# Patient Record
Sex: FEMALE | Race: WHITE | NOT HISPANIC OR LATINO
[De-identification: names, ages, dates, MRNs, and addresses within clinical notes are randomized per-mention and may not be internally consistent; named-entity substitution may affect disease eponyms.]

---

## 2022-06-21 PROBLEM — Z00.00 ENCOUNTER FOR PREVENTIVE HEALTH EXAMINATION: Status: ACTIVE | Noted: 2022-06-21

## 2022-06-27 DIAGNOSIS — Z86.79 PERSONAL HISTORY OF OTHER DISEASES OF THE CIRCULATORY SYSTEM: ICD-10-CM

## 2022-06-27 DIAGNOSIS — Z86.39 PERSONAL HISTORY OF OTHER ENDOCRINE, NUTRITIONAL AND METABOLIC DISEASE: ICD-10-CM

## 2022-06-27 DIAGNOSIS — A49.9 URINARY TRACT INFECTION, SITE NOT SPECIFIED: ICD-10-CM

## 2022-06-27 DIAGNOSIS — Z86.711 PERSONAL HISTORY OF PULMONARY EMBOLISM: ICD-10-CM

## 2022-06-27 DIAGNOSIS — N39.0 URINARY TRACT INFECTION, SITE NOT SPECIFIED: ICD-10-CM

## 2022-06-27 RX ORDER — OMEPRAZOLE 20 MG/1
20 TABLET, DELAYED RELEASE ORAL
Refills: 0 | Status: ACTIVE | COMMUNITY

## 2022-06-27 RX ORDER — ATORVASTATIN CALCIUM 20 MG/1
20 TABLET, FILM COATED ORAL
Refills: 0 | Status: ACTIVE | COMMUNITY

## 2022-06-27 RX ORDER — IBANDRONATE SODIUM 3 MG/3ML
3 INJECTION, SOLUTION INTRAVENOUS
Refills: 0 | Status: ACTIVE | COMMUNITY

## 2022-06-28 ENCOUNTER — APPOINTMENT (OUTPATIENT)
Dept: SURGERY | Facility: CLINIC | Age: 87
End: 2022-06-28

## 2022-06-28 VITALS
HEART RATE: 85 BPM | BODY MASS INDEX: 23.99 KG/M2 | SYSTOLIC BLOOD PRESSURE: 121 MMHG | HEIGHT: 65 IN | TEMPERATURE: 97.6 F | DIASTOLIC BLOOD PRESSURE: 74 MMHG | WEIGHT: 144 LBS

## 2022-06-28 DIAGNOSIS — R91.8 OTHER NONSPECIFIC ABNORMAL FINDING OF LUNG FIELD: ICD-10-CM

## 2022-06-28 DIAGNOSIS — Z87.19 PERSONAL HISTORY OF OTHER DISEASES OF THE DIGESTIVE SYSTEM: ICD-10-CM

## 2022-06-28 PROCEDURE — 99024 POSTOP FOLLOW-UP VISIT: CPT

## 2022-06-28 NOTE — PHYSICAL EXAM
[Respiratory Effort] : normal respiratory effort [No Rash or Lesion] : No rash or lesion [Alert] : alert [Oriented to Person] : oriented to person [Oriented to Place] : oriented to place [Calm] : calm [Abdominal Masses] : No abdominal masses [Abdomen Tenderness] : ~T ~M No abdominal tenderness [de-identified] : NAD [de-identified] : healed skin cancer lesion at the top of her head.  [de-identified] : no SOB [de-identified] : soft, mildly distended lower abdomen.  No tenderness.  Staples removed by me today.  incision in mid-line well healed.

## 2022-06-28 NOTE — ASSESSMENT
[FreeTextEntry1] : 1)  s/p exlap for sbo caused by adhesion.  Bowel returned to function, though still some bloating\par 2) Lung mass showing non-cancerous cells.  Discussed briefly w patient as she was unaware of the results.

## 2022-06-28 NOTE — PLAN
[FreeTextEntry1] : 1) F/U w me as needed. \par 2) F/U w Dr. Grande to discuss further steps/treatment regarding lung mass.

## 2022-06-28 NOTE — CONSULT LETTER
[Dear  ___] : Dear  [unfilled], [Courtesy Letter:] : I had the pleasure of seeing your patient, [unfilled], in my office today. [Please see my note below.] : Please see my note below. [Consult Closing:] : Thank you very much for allowing me to participate in the care of this patient.  If you have any questions, please do not hesitate to contact me. [Sincerely,] : Sincerely, [FreeTextEntry3] : Anupama Kaur MD FACS\par Acute Care and General Surgery\par [facility]\par \par Office phone: [phone]\par Cell phone:  [phone]\par email:  [email or web address]\par

## 2022-07-01 ENCOUNTER — APPOINTMENT (OUTPATIENT)
Dept: THORACIC SURGERY | Facility: CLINIC | Age: 87
End: 2022-07-01

## 2022-07-01 PROCEDURE — 99215 OFFICE O/P EST HI 40 MIN: CPT

## 2022-07-20 ENCOUNTER — TRANSCRIPTION ENCOUNTER (OUTPATIENT)
Age: 87
End: 2022-07-20

## 2022-07-20 ENCOUNTER — APPOINTMENT (OUTPATIENT)
Dept: THORACIC SURGERY | Facility: HOSPITAL | Age: 87
End: 2022-07-20

## 2022-07-20 ENCOUNTER — RESULT REVIEW (OUTPATIENT)
Age: 87
End: 2022-07-20

## 2022-07-28 ENCOUNTER — APPOINTMENT (OUTPATIENT)
Dept: THORACIC SURGERY | Facility: CLINIC | Age: 87
End: 2022-07-28

## 2022-07-28 VITALS
BODY MASS INDEX: 23.99 KG/M2 | WEIGHT: 144 LBS | HEART RATE: 90 BPM | DIASTOLIC BLOOD PRESSURE: 88 MMHG | OXYGEN SATURATION: 98 % | RESPIRATION RATE: 12 BRPM | SYSTOLIC BLOOD PRESSURE: 124 MMHG | HEIGHT: 65 IN

## 2022-07-28 PROCEDURE — 99214 OFFICE O/P EST MOD 30 MIN: CPT

## 2022-08-10 ENCOUNTER — RESULT REVIEW (OUTPATIENT)
Age: 87
End: 2022-08-10

## 2022-08-12 ENCOUNTER — TRANSCRIPTION ENCOUNTER (OUTPATIENT)
Age: 87
End: 2022-08-12

## 2022-08-31 ENCOUNTER — APPOINTMENT (OUTPATIENT)
Dept: THORACIC SURGERY | Facility: HOSPITAL | Age: 87
End: 2022-08-31

## 2022-08-31 ENCOUNTER — RESULT REVIEW (OUTPATIENT)
Age: 87
End: 2022-08-31

## 2022-09-02 ENCOUNTER — TRANSCRIPTION ENCOUNTER (OUTPATIENT)
Age: 87
End: 2022-09-02

## 2022-09-07 ENCOUNTER — APPOINTMENT (OUTPATIENT)
Dept: SURGERY | Facility: CLINIC | Age: 87
End: 2022-09-07

## 2022-09-07 VITALS
HEART RATE: 101 BPM | DIASTOLIC BLOOD PRESSURE: 85 MMHG | HEIGHT: 65 IN | BODY MASS INDEX: 23.32 KG/M2 | OXYGEN SATURATION: 98 % | SYSTOLIC BLOOD PRESSURE: 135 MMHG | WEIGHT: 140 LBS | TEMPERATURE: 97.3 F

## 2022-09-07 DIAGNOSIS — Z87.19 PERSONAL HISTORY OF OTHER DISEASES OF THE DIGESTIVE SYSTEM: ICD-10-CM

## 2022-09-07 NOTE — PHYSICAL EXAM
[JVD] : no jugular venous distention  [Normal Rate and Rhythm] : normal rate and rhythm [de-identified] : soft, NT/ND, incisions well healed

## 2022-09-07 NOTE — HISTORY OF PRESENT ILLNESS
[de-identified] : s/p laparoscopic cholecystectomy and ERCP for cholangitis.  Pathology reviewed [de-identified] : Doing well.  No surgical complaints.  Tolerating a diet

## 2022-09-16 ENCOUNTER — APPOINTMENT (OUTPATIENT)
Dept: THORACIC SURGERY | Facility: CLINIC | Age: 87
End: 2022-09-16

## 2023-02-08 ENCOUNTER — TRANSCRIPTION ENCOUNTER (OUTPATIENT)
Age: 88
End: 2023-02-08

## 2023-05-16 DIAGNOSIS — Z85.71 PERSONAL HISTORY OF HODGKIN LYMPHOMA: ICD-10-CM

## 2023-05-16 DIAGNOSIS — I35.0 NONRHEUMATIC AORTIC (VALVE) STENOSIS: ICD-10-CM

## 2023-05-16 DIAGNOSIS — Z86.79 PERSONAL HISTORY OF OTHER DISEASES OF THE CIRCULATORY SYSTEM: ICD-10-CM

## 2023-05-18 ENCOUNTER — APPOINTMENT (OUTPATIENT)
Dept: THORACIC SURGERY | Facility: HOSPITAL | Age: 88
End: 2023-05-18
Payer: MEDICARE

## 2023-06-05 ENCOUNTER — APPOINTMENT (OUTPATIENT)
Dept: THORACIC SURGERY | Facility: HOSPITAL | Age: 88
End: 2023-06-05
Payer: MEDICARE

## 2023-06-05 VITALS
HEART RATE: 82 BPM | DIASTOLIC BLOOD PRESSURE: 84 MMHG | SYSTOLIC BLOOD PRESSURE: 137 MMHG | HEIGHT: 65 IN | OXYGEN SATURATION: 98 %

## 2023-06-05 PROCEDURE — 99215 OFFICE O/P EST HI 40 MIN: CPT

## 2023-06-05 NOTE — PHYSICAL EXAM
[General Appearance - Alert] : alert [General Appearance - Well Developed] : well developed [General Appearance - In No Acute Distress] : in no acute distress [Sclera] : the sclera and conjunctiva were normal [PERRL With Normal Accommodation] : pupils were equal in size, round, and reactive to light [Outer Ear] : the ears and nose were normal in appearance [Neck Appearance] : the appearance of the neck was normal [Neck Cervical Mass (___cm)] : no neck mass was observed [Respiration, Rhythm And Depth] : normal respiratory rhythm and effort [Auscultation Breath Sounds / Voice Sounds] : lungs were clear to auscultation bilaterally [Examination Of The Chest] : the chest was normal in appearance [Abnormal Walk] : normal gait [Skin Color & Pigmentation] : normal skin color and pigmentation [] : no rash [Sensation] : the sensory exam was normal to light touch and pinprick [No Focal Deficits] : no focal deficits [Oriented To Time, Place, And Person] : oriented to person, place, and time [Impaired Insight] : insight and judgment were intact [Affect] : the affect was normal

## 2023-06-06 NOTE — ASSESSMENT
[FreeTextEntry1] : 87-year-old former smoker, who was found to have a left upper lobe mass in 2022.  Biopsy of the mass and subsequent cervical mediastinoscopy were negative for malignancy.  Nonetheless PET CT scan was highly suspicious for malignancy and she underwent left upper lobectomy and mediastinal lymph node dissection with thoracic surgery 7/20/2022.  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 She was then followed by Dr. Miguel. Given her age and multiple previous hospitalizations she has been on observation and has not received chemotherapy.  Her recent  PET CT scan from 5/2/2023 shows new mediastinal lymphadenopathy and she has been recommended for biopsy. She now presents ti thoracic office for evaluation. \par \par PET-CT as below:\par \par PET/CT 5/2/2023 - In the chest, 4 or 5 newly enlarged left anterior mediastinum prevascular lymph \par nodes consistent with lymphadenopathy measuring up to 1.4 cm. The largest lymph \par node anteriorly image 50 SUV max 7.2 while the other lymph nodes demonstrate SUV \par max 6.0.  \par \par Imaging reviewed with patient and shows left anterior mediastinal FDG avid lymphadenopathy. The etiology is unclear but recurrent lymphoma or other malignancy cannot be excluded. CT guided biopsy of the lesion is likely not possible and not diagnostic if lymphoma. Left Robotic Assisted/VATS redo mediastinal space biopsy, possible thoracotomy would be required. The risks and benefits of the procedure were discussed at length including but not limited to risks of infection, bleeding, need for thoracotomy, arrhythmias, thromboembolic complications, myocardial infarction, need for repetitive procedures, recurrent laryngeal nerve injury leading to hoarseness, as well as risks of anesthesia. Specific risks discussed included risk of benign diagnosis, lack of diagnosis, need for thoracotomy, and prolonged air leak requiring chest tube drainage.\par \par She will need a cardiology evaluation prior to surgery at [facility].\par \par Plan:\par Left Robotic Assisted/VATS redo mediastinal space biopsy, possible thoracotomy\par Cardiology clearance\par \par Leandro BOCANEGRA MD personally performed the services described in the documentation, reviewed the documentation recorded by the scribe in my presence and it accurately and completely records my words and actions. I have personally seen, examined, and participated in the care of this patient.  I have reviewed all pertinent clinical information, including history and physical exam and plan.  I agree with the above history, physical and plan of the ACP which I have reviewed and edited where appropriate.\par \par Total time of 40 minutes with > 50% spent with the patient discussing radiologic studies, diagnosis, treatment options, and risks and benefits of surgery.\par \par

## 2023-06-06 NOTE — DATA REVIEWED
[FreeTextEntry1] : PET CT 5/2/2023\par   \par History: Hodgkin's disease \par   \par Technique:  The patient received 10.2 mCi of F18-FDG intravenously via a right \par hand injection.  One hour later PET images were obtained from base of skull \par through the proximal thighs.  A low dose noncontrast CT was performed for the \par purposes of attenuation correction and anatomic coregistration with the PET \par images.  Fasting blood glucose level was 105 mg/dL at time of injection. \par   \par Comparison/Correlation: PET CT 10/17/2022. \par   \par Findings: \par   \par In the neck, there is no abnormal metabolic activity. \par   \par In the chest, 4 or 5 newly enlarged left anterior mediastinum prevascular lymph \par nodes consistent with lymphadenopathy measuring up to 1.4 cm. The largest lymph \par node anteriorly image 50 SUV max 7.2 while the other lymph nodes demonstrate SUV \par max 6.0. \par   \par Mild uptake left hilar lymph node on previous resolved. Stable cardiomegaly and \par transvenous pacemaker. Stable postop changes left lung. \par   \par Background mediastinal uptake SUV max 2.6. \par   \par In the abdomen/pelvis,  there is no abnormal metabolic activity. Linear density \par right upper quadrant suggesting an internalized common bile duct stent with the \par distal aspect in the region of the duodenum. Correlate clinically. \par   \par Evaluation of the osseous structures demonstrates no focal hypermetabolic \par osseous lesion. \par   \par   \par IMPRESSION: \par   \par New hypermetabolic anterior mediastinal lymphadenopathy suggesting recurrent \par Hodgkin's disease. Correlate clinically. \par   \par Additional findings as described.

## 2023-06-06 NOTE — HISTORY OF PRESENT ILLNESS
[FreeTextEntry1] : 87-year-old former smoker, who was found to have a left upper lobe mass in 2022.  Biopsy of the mass and subsequent cervical mediastinoscopy were negative for malignancy.  Nonetheless PET CT scan was highly suspicious for malignancy and she underwent left upper lobectomy and mediastinal lymph node dissection with thoracic surgery 7/20/2022.  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 She was then followed by Dr. Miguel. Given her age and multiple previous hospitalizations she has been on observation and has not received chemotherapy.  Her recent  PET CT scan from 5/2/2023 shows new mediastinal lymphadenopathy and she has been recommended for biopsy. She now presents to thoracic office for evaluation. \par \par PET-CT as below:\par \par PET/CT 5/2/2023 - In the chest, 4 or 5 newly enlarged left anterior mediastinum prevascular lymph \par nodes consistent with lymphadenopathy measuring up to 1.4 cm. The largest lymph \par node anteriorly image 50 SUV max 7.2 while the other lymph nodes demonstrate SUV \par max 6.0.

## 2023-06-07 ENCOUNTER — NON-APPOINTMENT (OUTPATIENT)
Age: 88
End: 2023-06-07

## 2023-06-13 ENCOUNTER — TRANSCRIPTION ENCOUNTER (OUTPATIENT)
Age: 88
End: 2023-06-13

## 2023-06-13 RX ORDER — ASPIRIN 81 MG/81MG
81 CAPSULE ORAL DAILY
Refills: 0 | Status: ACTIVE | COMMUNITY

## 2023-06-13 RX ORDER — RIVAROXABAN 10 MG/1
10 TABLET, FILM COATED ORAL DAILY
Refills: 0 | Status: ACTIVE | COMMUNITY

## 2023-06-13 RX ORDER — APIXABAN 5 MG/1
5 TABLET, FILM COATED ORAL
Refills: 0 | Status: DISCONTINUED | COMMUNITY
End: 2023-06-13

## 2023-06-13 RX ORDER — VALSARTAN AND HYDROCHLOROTHIAZIDE 80; 12.5 MG/1; MG/1
80-12.5 TABLET, FILM COATED ORAL
Refills: 0 | Status: ACTIVE | COMMUNITY

## 2023-06-13 RX ORDER — METRONIDAZOLE 500 MG/1
TABLET ORAL
Refills: 0 | Status: DISCONTINUED | COMMUNITY
End: 2023-06-13

## 2023-06-13 RX ORDER — CHOLECALCIFEROL (VITAMIN D3) 25 MCG
25 MCG TABLET ORAL
Refills: 0 | Status: ACTIVE | COMMUNITY

## 2023-06-13 RX ORDER — FLUCONAZOLE 150 MG/1
TABLET ORAL
Refills: 0 | Status: DISCONTINUED | COMMUNITY
End: 2023-06-13

## 2023-06-13 RX ORDER — CEFDINIR 300 MG/1
CAPSULE ORAL
Refills: 0 | Status: DISCONTINUED | COMMUNITY
End: 2023-06-13

## 2023-06-13 RX ORDER — UREA 10 %
50 LOTION (ML) TOPICAL
Refills: 0 | Status: ACTIVE | COMMUNITY

## 2023-06-13 RX ORDER — DICLOFENAC SODIUM 1 %
KIT TOPICAL
Refills: 0 | Status: ACTIVE | COMMUNITY

## 2023-06-13 RX ORDER — FUROSEMIDE 20 MG/1
20 TABLET ORAL
Refills: 0 | Status: ACTIVE | COMMUNITY

## 2023-06-13 RX ORDER — DICLOFENAC SODIUM 50 MG
50 TABLET, DELAYED RELEASE (ENTERIC COATED) ORAL
Refills: 0 | Status: DISCONTINUED | COMMUNITY
End: 2023-06-13

## 2023-06-13 NOTE — H&P ADULT - ASSESSMENT
87-year-old former smoker, who was found to have a left upper lobe mass in 2022. Biopsy of the mass and subsequent cervical mediastinoscopy were negative for malignancy. Nonetheless PET CT scan was highly suspicious for malignancy and she underwent left upper lobectomy and mediastinal lymph node dissection with thoracic surgery 7/20/2022. 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 She was then followed by Dr. Miguel. Given her age and multiple previous hospitalizations she has been on observation and has not received chemotherapy. Her recent PET CT scan from 5/2/2023 shows new mediastinal lymphadenopathy and she has been recommended for biopsy. She now presents ti thoracic office for evaluation.     PET-CT as below:    PET/CT 5/2/2023 - In the chest, 4 or 5 newly enlarged left anterior mediastinum prevascular lymph   nodes consistent with lymphadenopathy measuring up to 1.4 cm. The largest lymph   node anteriorly image 50 SUV max 7.2 while the other lymph nodes demonstrate SUV   max 6.0.     Imaging reviewed with patient and shows left anterior mediastinal FDG avid lymphadenopathy. The etiology is unclear but recurrent lymphoma or other malignancy cannot be excluded. CT guided biopsy of the lesion is likely not possible and not diagnostic if lymphoma. Left Robotic Assisted/VATS redo mediastinal space biopsy, possible thoracotomy would be required. The risks and benefits of the procedure were discussed at length including but not limited to risks of infection, bleeding, need for thoracotomy, arrhythmias, thromboembolic complications, myocardial infarction, need for repetitive procedures, recurrent laryngeal nerve injury leading to hoarseness, as well as risks of anesthesia. Specific risks discussed included risk of benign diagnosis, lack of diagnosis, need for thoracotomy, and prolonged air leak requiring chest tube drainage.    She will need a cardiology evaluation prior to surgery at [facility].    Plan:  Left Robotic Assisted/VATS redo mediastinal space biopsy, possible thoracotomy  Cardiology clearance     87-year-old former smoker, who was found to have a left upper lobe mass in 2022. Biopsy of the mass and subsequent cervical mediastinoscopy were negative for malignancy. Nonetheless PET CT scan was highly suspicious for malignancy and she underwent left upper lobectomy and mediastinal lymph node dissection with thoracic surgery 7/20/2022. 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 She was then followed by Dr. Miguel. Given her age and multiple previous hospitalizations she has been on observation and has not received chemotherapy. Her recent PET CT scan from 5/2/2023 shows new mediastinal lymphadenopathy and she has been recommended for biopsy. She now presents ti thoracic office for evaluation.     PET-CT as below:    PET/CT 5/2/2023 - In the chest, 4 or 5 newly enlarged left anterior mediastinum prevascular lymph   nodes consistent with lymphadenopathy measuring up to 1.4 cm. The largest lymph   node anteriorly image 50 SUV max 7.2 while the other lymph nodes demonstrate SUV   max 6.0.     Imaging reviewed with patient and shows left anterior mediastinal FDG avid lymphadenopathy. The etiology is unclear but recurrent lymphoma or other malignancy cannot be excluded. CT guided biopsy of the lesion is likely not possible and not diagnostic if lymphoma. Left Robotic Assisted/VATS redo mediastinal space biopsy, possible thoracotomy would be required. The risks and benefits of the procedure were discussed at length including but not limited to risks of infection, bleeding, need for thoracotomy, arrhythmias, thromboembolic complications, myocardial infarction, need for repetitive procedures, recurrent laryngeal nerve injury leading to hoarseness, as well as risks of anesthesia. Specific risks discussed included risk of benign diagnosis, lack of diagnosis, need for thoracotomy, and prolonged air leak requiring chest tube drainage.    She will need a cardiology evaluation prior to surgery at [facility].    Plan:  Left Robotic Assisted/VATS redo mediastinal space biopsy, possible thoracotomy  Cardiology clearance    Admit under Dr. Ramirez via same day surgery. Consent signed, placed on chart.  Risks/benefits reviewed, patient understands and agrees. T&S ordered and blood products placed on hold for OR.  To 9LA  post-op.

## 2023-06-13 NOTE — H&P ADULT - HISTORY OF PRESENT ILLNESS
87-year-old former smoker, who was found to have a left upper lobe mass in 2022. Biopsy of the mass and subsequent cervical mediastinoscopy were negative for malignancy. Nonetheless PET CT scan was highly suspicious for malignancy and she underwent left upper lobectomy and mediastinal lymph node dissection with thoracic surgery 7/20/2022. 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 She was then followed by Dr. Miguel. Given her age and multiple previous hospitalizations she has been on observation and has not received chemotherapy. Her recent PET CT scan from 5/2/2023 shows new mediastinal lymphadenopathy and she has been recommended for biopsy. She now presents to thoracic office for evaluation.     PET-CT as below:    PET/CT 5/2/2023 - In the chest, 4 or 5 newly enlarged left anterior mediastinum prevascular lymph   nodes consistent with lymphadenopathy measuring up to 1.4 cm. The largest lymph   node anteriorly image 50 SUV max 7.2 while the other lymph nodes demonstrate SUV   max 6.0.    87-year-old former smoker, who was found to have a left upper lobe mass in 2022. Biopsy of the mass and subsequent cervical mediastinoscopy were negative for malignancy. Nonetheless PET CT scan was highly suspicious for malignancy and she underwent left upper lobectomy and mediastinal lymph node dissection with thoracic surgery 7/20/2022. 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 She was then followed by Dr. Miguel. Given her age and multiple previous hospitalizations she has been on observation and has not received chemotherapy. Her recent PET CT scan from 5/2/2023 shows new mediastinal lymphadenopathy and she has been recommended for biopsy. She now presents to thoracic office for evaluation.     PET-CT as below:    PET/CT 5/2/2023 - In the chest, 4 or 5 newly enlarged left anterior mediastinum prevascular lymph   nodes consistent with lymphadenopathy measuring up to 1.4 cm. The largest lymph   node anteriorly image 50 SUV max 7.2 while the other lymph nodes demonstrate SUV   max 6.0.     Patient seen in same day holding area; Reports no changes to PMHx or medications since last seen by our team. Denies acute or current SOB, chest pain, palpitation, N/V/D, fever/chills, recent illness, or any other concerning symptoms.

## 2023-06-13 NOTE — H&P ADULT - NSICDXPASTMEDICALHX_GEN_ALL_CORE_FT
PAST MEDICAL HISTORY:  At risk of UTI     H/O aortic valve stenosis     H/O cholangitis     History of coronary artery disease     History of coronary atherosclerosis     History of high cholesterol     History of Hodgkin's lymphoma     History of hypertension     History of pulmonary embolism     History of second degree heart block

## 2023-06-13 NOTE — H&P ADULT - NSHPPHYSICALEXAM_GEN_ALL_CORE
wt; 140 lb   Constitutional: alert, in no acute distress and well developed.   Eyes: the sclera and conjunctiva were normal and pupils were equal in size, round, and reactive to light.   ENT: the ears and nose were normal in appearance.   Neck: the appearance of the neck was normal and no neck mass was observed.   Pulmonary: no respiratory distress, normal respiratory rhythm and effort and lungs were clear to auscultation bilaterally.   Chest: the chest was normal in appearance.   Musculoskeletal: normal gait.   Skin: normal skin color and pigmentation and no rash.   Neurological: the sensory exam was normal to light touch and pinprick and no focal deficits.   Psychiatric: oriented to person, place, and time, insight and judgment were intact and the affect was normal.

## 2023-06-13 NOTE — H&P ADULT - NSICDXPASTSURGICALHX_GEN_ALL_CORE_FT
PAST SURGICAL HISTORY:  History of exploratory laparotomy     History of laparoscopic cholecystectomy     History of lung biopsy

## 2023-06-13 NOTE — H&P ADULT - NSHPLABSRESULTS_GEN_ALL_CORE
PET CT 5/2/2023     History: Hodgkin's disease      Technique: The patient received 10.2 mCi of F18-FDG intravenously via a right   hand injection. One hour later PET images were obtained from base of skull   through the proximal thighs. A low dose noncontrast CT was performed for the   purposes of attenuation correction and anatomic coregistration with the PET   images. Fasting blood glucose level was 105 mg/dL at time of injection.      Comparison/Correlation: PET CT 10/17/2022.      Findings:      In the neck, there is no abnormal metabolic activity.      In the chest, 4 or 5 newly enlarged left anterior mediastinum prevascular lymph   nodes consistent with lymphadenopathy measuring up to 1.4 cm. The largest lymph   node anteriorly image 50 SUV max 7.2 while the other lymph nodes demonstrate SUV   max 6.0.      Mild uptake left hilar lymph node on previous resolved. Stable cardiomegaly and   transvenous pacemaker. Stable postop changes left lung.      Background mediastinal uptake SUV max 2.6.      In the abdomen/pelvis, there is no abnormal metabolic activity. Linear density   right upper quadrant suggesting an internalized common bile duct stent with the   distal aspect in the region of the duodenum. Correlate clinically.      Evaluation of the osseous structures demonstrates no focal hypermetabolic   osseous lesion.         IMPRESSION:      New hypermetabolic anterior mediastinal lymphadenopathy suggesting recurrent   Hodgkin's disease. Correlate clinically.      Additional findings as described.

## 2023-06-14 ENCOUNTER — RESULT REVIEW (OUTPATIENT)
Age: 88
End: 2023-06-14

## 2023-06-14 ENCOUNTER — INPATIENT (INPATIENT)
Facility: HOSPITAL | Age: 88
LOS: 0 days | Discharge: HOME | DRG: 822 | End: 2023-06-15
Attending: THORACIC SURGERY (CARDIOTHORACIC VASCULAR SURGERY) | Admitting: THORACIC SURGERY (CARDIOTHORACIC VASCULAR SURGERY)
Payer: MEDICARE

## 2023-06-14 ENCOUNTER — TRANSCRIPTION ENCOUNTER (OUTPATIENT)
Age: 88
End: 2023-06-14

## 2023-06-14 ENCOUNTER — APPOINTMENT (OUTPATIENT)
Dept: CARDIOTHORACIC SURGERY | Facility: HOSPITAL | Age: 88
End: 2023-06-14
Payer: MEDICARE

## 2023-06-14 VITALS
HEIGHT: 65 IN | WEIGHT: 148.15 LBS | OXYGEN SATURATION: 99 % | TEMPERATURE: 98 F | HEART RATE: 72 BPM | SYSTOLIC BLOOD PRESSURE: 130 MMHG | DIASTOLIC BLOOD PRESSURE: 78 MMHG | RESPIRATION RATE: 16 BRPM

## 2023-06-14 DIAGNOSIS — Z90.49 ACQUIRED ABSENCE OF OTHER SPECIFIED PARTS OF DIGESTIVE TRACT: Chronic | ICD-10-CM

## 2023-06-14 DIAGNOSIS — Z98.890 OTHER SPECIFIED POSTPROCEDURAL STATES: Chronic | ICD-10-CM

## 2023-06-14 PROBLEM — Z86.79 PERSONAL HISTORY OF OTHER DISEASES OF THE CIRCULATORY SYSTEM: Chronic | Status: ACTIVE | Noted: 2023-06-13

## 2023-06-14 PROBLEM — Z86.711 PERSONAL HISTORY OF PULMONARY EMBOLISM: Chronic | Status: ACTIVE | Noted: 2023-06-13

## 2023-06-14 LAB
ANION GAP SERPL CALC-SCNC: 7 MMOL/L — SIGNIFICANT CHANGE UP (ref 5–17)
APTT BLD: 29.4 SEC — SIGNIFICANT CHANGE UP (ref 27.5–35.5)
BASOPHILS # BLD AUTO: 0.05 K/UL — SIGNIFICANT CHANGE UP (ref 0–0.2)
BASOPHILS NFR BLD AUTO: 1.1 % — SIGNIFICANT CHANGE UP (ref 0–2)
BLD GP AB SCN SERPL QL: POSITIVE — SIGNIFICANT CHANGE UP
BUN SERPL-MCNC: 18 MG/DL — SIGNIFICANT CHANGE UP (ref 7–23)
CALCIUM SERPL-MCNC: 8.5 MG/DL — SIGNIFICANT CHANGE UP (ref 8.4–10.5)
CHLORIDE SERPL-SCNC: 110 MMOL/L — HIGH (ref 96–108)
CO2 SERPL-SCNC: 24 MMOL/L — SIGNIFICANT CHANGE UP (ref 22–31)
CREAT SERPL-MCNC: 0.5 MG/DL — SIGNIFICANT CHANGE UP (ref 0.5–1.3)
EGFR: 91 ML/MIN/1.73M2 — SIGNIFICANT CHANGE UP
EOSINOPHIL # BLD AUTO: 0.22 K/UL — SIGNIFICANT CHANGE UP (ref 0–0.5)
EOSINOPHIL NFR BLD AUTO: 4.6 % — SIGNIFICANT CHANGE UP (ref 0–6)
GLUCOSE SERPL-MCNC: 100 MG/DL — HIGH (ref 70–99)
HCT VFR BLD CALC: 33.3 % — LOW (ref 34.5–45)
HGB BLD-MCNC: 11 G/DL — LOW (ref 11.5–15.5)
IMM GRANULOCYTES NFR BLD AUTO: 0.2 % — SIGNIFICANT CHANGE UP (ref 0–0.9)
INR BLD: 1.07 — SIGNIFICANT CHANGE UP (ref 0.88–1.16)
LYMPHOCYTES # BLD AUTO: 0.89 K/UL — LOW (ref 1–3.3)
LYMPHOCYTES # BLD AUTO: 18.7 % — SIGNIFICANT CHANGE UP (ref 13–44)
MAGNESIUM SERPL-MCNC: 1.7 MG/DL — SIGNIFICANT CHANGE UP (ref 1.6–2.6)
MCHC RBC-ENTMCNC: 31.5 PG — SIGNIFICANT CHANGE UP (ref 27–34)
MCHC RBC-ENTMCNC: 33 GM/DL — SIGNIFICANT CHANGE UP (ref 32–36)
MCV RBC AUTO: 95.4 FL — SIGNIFICANT CHANGE UP (ref 80–100)
MONOCYTES # BLD AUTO: 0.37 K/UL — SIGNIFICANT CHANGE UP (ref 0–0.9)
MONOCYTES NFR BLD AUTO: 7.8 % — SIGNIFICANT CHANGE UP (ref 2–14)
NEUTROPHILS # BLD AUTO: 3.22 K/UL — SIGNIFICANT CHANGE UP (ref 1.8–7.4)
NEUTROPHILS NFR BLD AUTO: 67.6 % — SIGNIFICANT CHANGE UP (ref 43–77)
NRBC # BLD: 0 /100 WBCS — SIGNIFICANT CHANGE UP (ref 0–0)
PLATELET # BLD AUTO: 183 K/UL — SIGNIFICANT CHANGE UP (ref 150–400)
POTASSIUM SERPL-MCNC: 3.7 MMOL/L — SIGNIFICANT CHANGE UP (ref 3.5–5.3)
POTASSIUM SERPL-SCNC: 3.7 MMOL/L — SIGNIFICANT CHANGE UP (ref 3.5–5.3)
PROTHROM AB SERPL-ACNC: 12.7 SEC — SIGNIFICANT CHANGE UP (ref 10.5–13.4)
RBC # BLD: 3.49 M/UL — LOW (ref 3.8–5.2)
RBC # FLD: 12.8 % — SIGNIFICANT CHANGE UP (ref 10.3–14.5)
RH IG SCN BLD-IMP: POSITIVE — SIGNIFICANT CHANGE UP
SODIUM SERPL-SCNC: 141 MMOL/L — SIGNIFICANT CHANGE UP (ref 135–145)
WBC # BLD: 4.76 K/UL — SIGNIFICANT CHANGE UP (ref 3.8–10.5)
WBC # FLD AUTO: 4.76 K/UL — SIGNIFICANT CHANGE UP (ref 3.8–10.5)

## 2023-06-14 PROCEDURE — ZZZZZ: CPT

## 2023-06-14 PROCEDURE — S2900 ROBOTIC SURGICAL SYSTEM: CPT | Mod: NC

## 2023-06-14 PROCEDURE — 99232 SBSQ HOSP IP/OBS MODERATE 35: CPT

## 2023-06-14 PROCEDURE — 88342 IMHCHEM/IMCYTCHM 1ST ANTB: CPT | Mod: 26

## 2023-06-14 PROCEDURE — 88331 PATH CONSLTJ SURG 1 BLK 1SPC: CPT | Mod: 26

## 2023-06-14 PROCEDURE — 88341 IMHCHEM/IMCYTCHM EA ADD ANTB: CPT | Mod: 26

## 2023-06-14 PROCEDURE — 32606 THORACOSCOPY W/BX MED SPACE: CPT

## 2023-06-14 PROCEDURE — 86077 PHYS BLOOD BANK SERV XMATCH: CPT

## 2023-06-14 PROCEDURE — 32606 THORACOSCOPY W/BX MED SPACE: CPT | Mod: 80

## 2023-06-14 PROCEDURE — 88307 TISSUE EXAM BY PATHOLOGIST: CPT | Mod: 26

## 2023-06-14 PROCEDURE — 71045 X-RAY EXAM CHEST 1 VIEW: CPT | Mod: 26

## 2023-06-14 DEVICE — SURGICEL 4 X 8": Type: IMPLANTABLE DEVICE | Site: LEFT | Status: FUNCTIONAL

## 2023-06-14 RX ORDER — PANTOPRAZOLE SODIUM 20 MG/1
40 TABLET, DELAYED RELEASE ORAL
Refills: 0 | Status: DISCONTINUED | OUTPATIENT
Start: 2023-06-14 | End: 2023-06-15

## 2023-06-14 RX ORDER — HEPARIN SODIUM 5000 [USP'U]/ML
5000 INJECTION INTRAVENOUS; SUBCUTANEOUS ONCE
Refills: 0 | Status: COMPLETED | OUTPATIENT
Start: 2023-06-14 | End: 2023-06-14

## 2023-06-14 RX ORDER — ACETAMINOPHEN 500 MG
1000 TABLET ORAL ONCE
Refills: 0 | Status: COMPLETED | OUTPATIENT
Start: 2023-06-14 | End: 2023-06-14

## 2023-06-14 RX ORDER — IBUPROFEN 200 MG
400 TABLET ORAL EVERY 6 HOURS
Refills: 0 | Status: DISCONTINUED | OUTPATIENT
Start: 2023-06-14 | End: 2023-06-15

## 2023-06-14 RX ORDER — SODIUM CHLORIDE 9 MG/ML
250 INJECTION, SOLUTION INTRAVENOUS ONCE
Refills: 0 | Status: DISCONTINUED | OUTPATIENT
Start: 2023-06-14 | End: 2023-06-14

## 2023-06-14 RX ORDER — ACETAMINOPHEN 500 MG
650 TABLET ORAL EVERY 6 HOURS
Refills: 0 | Status: DISCONTINUED | OUTPATIENT
Start: 2023-06-15 | End: 2023-06-15

## 2023-06-14 RX ORDER — ATORVASTATIN CALCIUM 80 MG/1
20 TABLET, FILM COATED ORAL AT BEDTIME
Refills: 0 | Status: DISCONTINUED | OUTPATIENT
Start: 2023-06-14 | End: 2023-06-15

## 2023-06-14 RX ORDER — SENNA PLUS 8.6 MG/1
2 TABLET ORAL AT BEDTIME
Refills: 0 | Status: DISCONTINUED | OUTPATIENT
Start: 2023-06-14 | End: 2023-06-15

## 2023-06-14 RX ORDER — LIDOCAINE 4 G/100G
1 CREAM TOPICAL EVERY 24 HOURS
Refills: 0 | Status: DISCONTINUED | OUTPATIENT
Start: 2023-06-14 | End: 2023-06-15

## 2023-06-14 RX ORDER — ACETAMINOPHEN 500 MG
650 TABLET ORAL ONCE
Refills: 0 | Status: COMPLETED | OUTPATIENT
Start: 2023-06-14 | End: 2023-06-14

## 2023-06-14 RX ORDER — SODIUM CHLORIDE 9 MG/ML
1000 INJECTION, SOLUTION INTRAVENOUS
Refills: 0 | Status: DISCONTINUED | OUTPATIENT
Start: 2023-06-14 | End: 2023-06-15

## 2023-06-14 RX ORDER — ATORVASTATIN CALCIUM 80 MG/1
1 TABLET, FILM COATED ORAL
Refills: 0 | DISCHARGE

## 2023-06-14 RX ORDER — PYRIDOXINE HCL (VITAMIN B6) 100 MG
0 TABLET ORAL
Refills: 0 | DISCHARGE

## 2023-06-14 RX ORDER — SODIUM CHLORIDE 9 MG/ML
500 INJECTION, SOLUTION INTRAVENOUS ONCE
Refills: 0 | Status: COMPLETED | OUTPATIENT
Start: 2023-06-14 | End: 2023-06-14

## 2023-06-14 RX ORDER — FUROSEMIDE 40 MG
1 TABLET ORAL
Refills: 0 | DISCHARGE

## 2023-06-14 RX ORDER — CEFAZOLIN SODIUM 1 G
2000 VIAL (EA) INJECTION EVERY 8 HOURS
Refills: 0 | Status: COMPLETED | OUTPATIENT
Start: 2023-06-14 | End: 2023-06-15

## 2023-06-14 RX ORDER — IBANDRONATE SODIUM 150 MG/1
1 TABLET ORAL
Refills: 0 | DISCHARGE

## 2023-06-14 RX ORDER — ENOXAPARIN SODIUM 100 MG/ML
40 INJECTION SUBCUTANEOUS EVERY 24 HOURS
Refills: 0 | Status: DISCONTINUED | OUTPATIENT
Start: 2023-06-15 | End: 2023-06-15

## 2023-06-14 RX ORDER — METOPROLOL TARTRATE 50 MG
5 TABLET ORAL EVERY 6 HOURS
Refills: 0 | Status: DISCONTINUED | OUTPATIENT
Start: 2023-06-14 | End: 2023-06-15

## 2023-06-14 RX ORDER — TRAMADOL HYDROCHLORIDE 50 MG/1
25 TABLET ORAL ONCE
Refills: 0 | Status: DISCONTINUED | OUTPATIENT
Start: 2023-06-14 | End: 2023-06-15

## 2023-06-14 RX ORDER — CHOLECALCIFEROL (VITAMIN D3) 125 MCG
1 CAPSULE ORAL
Refills: 0 | DISCHARGE

## 2023-06-14 RX ORDER — HYDROMORPHONE HYDROCHLORIDE 2 MG/ML
0.25 INJECTION INTRAMUSCULAR; INTRAVENOUS; SUBCUTANEOUS EVERY 6 HOURS
Refills: 0 | Status: DISCONTINUED | OUTPATIENT
Start: 2023-06-14 | End: 2023-06-15

## 2023-06-14 RX ORDER — OMEPRAZOLE 10 MG/1
1 CAPSULE, DELAYED RELEASE ORAL
Refills: 0 | DISCHARGE

## 2023-06-14 RX ADMIN — SENNA PLUS 2 TABLET(S): 8.6 TABLET ORAL at 22:12

## 2023-06-14 RX ADMIN — Medication 650 MILLIGRAM(S): at 09:03

## 2023-06-14 RX ADMIN — Medication 1000 MILLIGRAM(S): at 22:01

## 2023-06-14 RX ADMIN — PANTOPRAZOLE SODIUM 40 MILLIGRAM(S): 20 TABLET, DELAYED RELEASE ORAL at 15:26

## 2023-06-14 RX ADMIN — SODIUM CHLORIDE 1000 MILLILITER(S): 9 INJECTION, SOLUTION INTRAVENOUS at 21:15

## 2023-06-14 RX ADMIN — HYDROMORPHONE HYDROCHLORIDE 0.25 MILLIGRAM(S): 2 INJECTION INTRAMUSCULAR; INTRAVENOUS; SUBCUTANEOUS at 20:33

## 2023-06-14 RX ADMIN — LIDOCAINE 1 PATCH: 4 CREAM TOPICAL at 15:26

## 2023-06-14 RX ADMIN — ATORVASTATIN CALCIUM 20 MILLIGRAM(S): 80 TABLET, FILM COATED ORAL at 22:08

## 2023-06-14 RX ADMIN — HEPARIN SODIUM 5000 UNIT(S): 5000 INJECTION INTRAVENOUS; SUBCUTANEOUS at 09:03

## 2023-06-14 RX ADMIN — Medication 400 MILLIGRAM(S): at 20:34

## 2023-06-14 RX ADMIN — HYDROMORPHONE HYDROCHLORIDE 0.25 MILLIGRAM(S): 2 INJECTION INTRAMUSCULAR; INTRAVENOUS; SUBCUTANEOUS at 21:08

## 2023-06-14 RX ADMIN — Medication 100 MILLIGRAM(S): at 21:08

## 2023-06-14 RX ADMIN — HYDROMORPHONE HYDROCHLORIDE 0.25 MILLIGRAM(S): 2 INJECTION INTRAMUSCULAR; INTRAVENOUS; SUBCUTANEOUS at 16:07

## 2023-06-14 RX ADMIN — HYDROMORPHONE HYDROCHLORIDE 0.25 MILLIGRAM(S): 2 INJECTION INTRAMUSCULAR; INTRAVENOUS; SUBCUTANEOUS at 15:26

## 2023-06-14 NOTE — PROGRESS NOTE ADULT - ASSESSMENT
Assessment:  87-year-old former smoker, who was found to have a left upper lobe mass in 2022. Biopsy of the mass and subsequent cervical mediastinoscopy were negative for malignancy. Nonetheless PET CT scan was highly suspicious for malignancy and she underwent left upper lobectomy and mediastinal lymph node dissection with thoracic surgery 7/20/2022. 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 She was then followed by Dr. Miguel. Given her age and multiple previous hospitalizations she has been on observation and has not received chemotherapy. Her recent PET CT scan from 5/2/2023 shows new mediastinal lymphadenopathy and she has been recommended for biopsy. She now presents to thoracic office for evaluation.    L VATS RA mediastinal bx  1 left pleural CT in place.   CXR w/ no pneumo.   Currently seen in PACU. Pain moderately controlled.     Plan:    Neurovascular:   -Pain well controlled with current regimen.   - PRN's: tylenol, ibuprofen,     Cardiovascular:   -Hemodynamically stable.   -Monitor: BP, HR, tele    Respiratory:   -Oxygenating well on room air  -Encourage continued use of IS 10x/hr and frequent ambulation  -CXR: Left apical chest tube, No consolidation or pleural effusion, no pneumothorax.    GI:  -GI PPX: PPI   -PO Diet  -Bowel Regimen: senna     Renal / :  -Continue to monitor renal function: BUN/Cr 18/0.50  -Monitor I/O's daily     Endocrine:    -No hx of DM or thyroid dx    Hematologic:  -no overt s/s of active bleeding   -CBC: H/H-11.0/33.3  -Coagulation Panel.    ID:  -Temperature: afebrile   -CBC: WBC-no leukocytosis   -Continue to observe for SIRS/Sepsis Syndrome.    Prophylaxis:  -DVT prophylaxis with Lovenox   -Continue with SCD's b/l while patient is at rest     Disposition:  -To floor for continued postop care   -Plan for discharge home when medically stable   -Timing to resume home DOAC per Dr. Monteiro   Assessment:  86yo F with past medical history of tobacco use and left upper lobe mass in 2022. Of note, patient underwent left upper lobectomy and mediastinal lymph node dissection 7/2022. Pathology consistent with Hodgkin's disease (no chemo). Recent PET scan with new mediastinal lymphadenopathy. Given history and study results it was recommended she undergo biopsy. Presented to our facility today and underwent uncomplicated L VATS RA mediastinal biopsy. 1 left pleural CT in place attached to suction. CXR with no pneumo. Seen in PACU. Pain well controlled on current regimen. Hemodynamically stable. CT with 1/5 air leak. Drainage currently 183cc (was 120cc on arrival to PACU).    Plan:    Neurovascular:   -Pain well controlled with current regimen.   -Continue PRN's: tylenol, ibuprofen, dilaudid, lidocaine     Cardiovascular:   HTN  - IV lopressor 5mg q6   - resume home Diovan HCT 80 mg-12.5 mg oral tablet when able   HLD   - on statin therapy   CAD   - currently without chest pain   - on ASA, statin   -Hemodynamically stable.   -Monitor: BP, HR, tele    Respiratory:   Mediastinal lymphadenopathy   - S/p L VATS RA and mediastinal biopsy today    - 1 left pleural CT in place; small air leak appreciated; CXR w/ no pneumo; output ~60cc since arrival to PACU   -Hx of pulmonary embolism     -  on home Xarelto (last dose 6/11; timing to restart per Dr. Ramirez)   -Oxygenating well on room air  -Encourage continued use of IS 10x/hr and frequent ambulation  -CXR: left apical chest tube, no consolidation or pleural effusion, no pneumothorax    GI:  -GI PPX: PPI   -PO Diet  -Bowel Regimen: senna     Renal / :  -Continue to monitor renal function: BUN/Cr 18/0.50  -Monitor I/O's daily     Endocrine:    -No hx of DM or thyroid dx    Hematologic:  -no overt s/s of active bleeding   -CBC: H/H-11.0/33.3  -Coagulation Panel.    ID:  -Temperature: afebrile   -CBC: WBC-no leukocytosis   -Continue to observe for SIRS/Sepsis Syndrome.    Prophylaxis:  -DVT prophylaxis with Lovenox   -Continue with SCD's b/l while patient is at rest     Disposition:  -To floor for continued postop care   -1 CT in place; 1/5 air leak; monitor output; CXR in AM   -Timing to resume home Xarelto per Dr. Ramirez/Dr. Monteiro    Assessment:  88yo F with past medical history of tobacco use and left upper lobe mass in 2022. Of note, patient underwent left upper lobectomy and mediastinal lymph node dissection 7/2022. Pathology consistent with Hodgkin's disease (no chemo). Recent PET scan with new mediastinal lymphadenopathy. Given history and study results it was recommended she undergo biopsy. Presented to our facility today and underwent uncomplicated L VATS RA mediastinal biopsy. 1 left pleural CT in place attached to suction. CXR with no pneumo. Seen in PACU. Pain well controlled on current regimen. Hemodynamically stable. CT with 1/5 air leak. Drainage currently 183cc (was 120cc on arrival to PACU).    Plan:    Neurovascular:   -Pain well controlled with current regimen.   -Continue PRN's: tylenol, ibuprofen, dilaudid, lidocaine     Cardiovascular:   HTN  - IV lopressor 5mg q6   - resume home Diovan HCT 80 mg-12.5 mg oral tablet when able   HLD   - on statin therapy   CAD   - currently without chest pain   - on ASA (held; likely resume tomorrow);, statin   -Hemodynamically stable.   -Monitor: BP, HR, tele    Respiratory:   Mediastinal lymphadenopathy   - S/p L VATS RA and mediastinal biopsy today    - 1 left pleural CT in place; small air leak appreciated; CXR w/ no pneumo; output ~60cc since arrival to PACU   -Hx of pulmonary embolism     -  on home Xarelto (last dose 6/11; timing to restart per Dr. Ramirez/Thania)   -Oxygenating well on room air  -Encourage continued use of IS 10x/hr and frequent ambulation  -CXR: left apical chest tube, no consolidation or pleural effusion, no pneumothorax    GI:  -GI PPX: PPI   -PO Diet  -Bowel Regimen: senna     Renal / :  -Continue to monitor renal function: BUN/Cr 18/0.50  -Monitor I/O's daily     Endocrine:    -No hx of DM or thyroid dx    Hematologic:  -no overt s/s of active bleeding   -CBC: H/H-11.0/33.3  -Coagulation Panel.    ID:  -Temperature: afebrile   -CBC: WBC-no leukocytosis   -Continue to observe for SIRS/Sepsis Syndrome.    Prophylaxis:  -DVT prophylaxis with Lovenox   -Continue with SCD's b/l while patient is at rest     Disposition:  -To floor for continued postop care   -1 CT in place; 1/5 air leak; monitor output; CXR in AM   -Timing to resume home El per Dr. Ramirez/Dr. Monteiro

## 2023-06-14 NOTE — PROGRESS NOTE ADULT - SUBJECTIVE AND OBJECTIVE BOX
Patient discussed with Dr. Monteiro.     OPERATION & DATE:    SUBJECTIVE ASSESSMENT: Seen in PACU following procedure. Mildly groggy from sedation but alert. Pain moderately controlled. 1 CT in situ attached to suction.     VITAL SIGNS:  Vital Signs Last 24 Hrs  T(C): 36.4 (14 Jun 2023 14:27), Max: 36.7 (14 Jun 2023 08:30)  T(F): 97.6 (14 Jun 2023 14:27), Max: 98.1 (14 Jun 2023 08:30)  HR: 87 (14 Jun 2023 16:37) (67 - 87)  BP: 109/64 (14 Jun 2023 16:37) (97/59 - 130/78)  BP(mean): 76 (14 Jun 2023 16:37) (74 - 85)  RR: 20 (14 Jun 2023 16:37) (12 - 26)  SpO2: 99% (14 Jun 2023 16:37) (97% - 100%)      I&O's Detail    14 Jun 2023 07:01  -  14 Jun 2023 17:43  --------------------------------------------------------  IN:    Lactated Ringers: 150 mL  Total IN: 150 mL    OUT:    Chest Tube (mL): 180 mL  Total OUT: 180 mL    Total NET: -30 mL    CHEST TUBE:  y  RACHEL DRAIN:  n  EPICARDIAL WIRES: n  STITCHES:n  STAPLES:n  MCCORD: n  CENTRAL LINE:n  MIDLINE/PICC:n  WOUND VAC:n    PHYSICAL EXAM:  General: NAD, sitting comfortably in chair, conversing appropriately  Neurological: alert and oriented, UE and LE strength equal b/l, facial symmetry present  Cardiovascular: RRR, Clear S1 and S2, no murmurs appreciated  Respiratory: chest expansion symmetrical, CTA b/l, no wheezing noted  Gastrointestinal: +BS, soft, NT, ND  Extremities: moving spontaneously, no calf tenderness or edema.  Vascular: warm, well perfused. DP/PT pulses palpable b/l.  Incisions: VATS incision C/D/I; CT in place attached to suction     LABS:                        11.0   4.76  )-----------( 183      ( 14 Jun 2023 14:38 )             33.3     PT/INR - ( 14 Jun 2023 14:56 )   PT: 12.7 sec;   INR: 1.07          PTT - ( 14 Jun 2023 14:56 )  PTT:29.4 sec  06-14    141  |  110<H>  |  18  ----------------------------<  100<H>  3.7   |  24  |  0.50    Ca    8.5      14 Jun 2023 15:09  Mg     1.7     06-14      MEDICATIONS  (STANDING):  acetaminophen   IVPB .. 1000 milliGRAM(s) IV Intermittent once  atorvastatin 20 milliGRAM(s) Oral at bedtime  ceFAZolin   IVPB 2000 milliGRAM(s) IV Intermittent every 8 hours  lactated ringers. 1000 milliLiter(s) (50 mL/Hr) IV Continuous <Continuous>  lidocaine   4% Patch 1 Patch Transdermal every 24 hours  pantoprazole    Tablet 40 milliGRAM(s) Oral before breakfast  senna 2 Tablet(s) Oral at bedtime    MEDICATIONS  (PRN):  HYDROmorphone  Injectable 0.25 milliGRAM(s) IV Push every 6 hours PRN Severe Pain (7 - 10)  ibuprofen  Tablet. 400 milliGRAM(s) Oral every 6 hours PRN Moderate Pain (4 - 6)  metoprolol tartrate Injectable 5 milliGRAM(s) IV Push every 6 hours PRN HYPERtension , SBP > 150    RADIOLOGY & ADDITIONAL TESTS:     Patient discussed with Dr. Monteiro.     OPERATION & DATE: S/p L VATS RA with mediastinal biopsy 6/14/2023    SUBJECTIVE ASSESSMENT: Seen in PACU following procedure. Mildly groggy from sedation but alert. Pain moderately controlled. 1 CT in situ attached to suction.     VITAL SIGNS:  Vital Signs Last 24 Hrs  T(C): 36.4 (14 Jun 2023 14:27), Max: 36.7 (14 Jun 2023 08:30)  T(F): 97.6 (14 Jun 2023 14:27), Max: 98.1 (14 Jun 2023 08:30)  HR: 87 (14 Jun 2023 16:37) (67 - 87)  BP: 109/64 (14 Jun 2023 16:37) (97/59 - 130/78)  BP(mean): 76 (14 Jun 2023 16:37) (74 - 85)  RR: 20 (14 Jun 2023 16:37) (12 - 26)  SpO2: 99% (14 Jun 2023 16:37) (97% - 100%)    I&O's Detail    14 Jun 2023 07:01  -  14 Jun 2023 17:43  --------------------------------------------------------  IN:    Lactated Ringers: 150 mL  Total IN: 150 mL    OUT:    Chest Tube (mL): 180 mL  Total OUT: 180 mL    Total NET: -30 mL    CHEST TUBE:  y, 1 left pleural CT in situ   RACHEL DRAIN:  n  EPICARDIAL WIRES: n  STITCHES:n  STAPLES:n  MCCORD: n  CENTRAL LINE:n  MIDLINE/PICC:n  WOUND VAC:n    PHYSICAL EXAM:  General: NAD, sitting comfortably in chair, conversing appropriately  Neurological: alert and oriented, UE and LE strength equal b/l, facial symmetry present  Cardiovascular: RRR, Clear S1 and S2, no murmurs appreciated  Respiratory: chest expansion symmetrical, CTA b/l, no wheezing noted  Gastrointestinal: +BS, soft, NT, ND  Extremities: moving spontaneously, no calf tenderness or edema.  Vascular: warm, well perfused. DP/PT pulses palpable b/l.  Incisions: VATS incision C/D/I; CT in place attached to suction     LABS:                        11.0   4.76  )-----------( 183      ( 14 Jun 2023 14:38 )             33.3     PT/INR - ( 14 Jun 2023 14:56 )   PT: 12.7 sec;   INR: 1.07          PTT - ( 14 Jun 2023 14:56 )  PTT:29.4 sec  06-14    141  |  110<H>  |  18  ----------------------------<  100<H>  3.7   |  24  |  0.50    Ca    8.5      14 Jun 2023 15:09  Mg     1.7     06-14      MEDICATIONS  (STANDING):  acetaminophen   IVPB .. 1000 milliGRAM(s) IV Intermittent once  atorvastatin 20 milliGRAM(s) Oral at bedtime  ceFAZolin   IVPB 2000 milliGRAM(s) IV Intermittent every 8 hours  lactated ringers. 1000 milliLiter(s) (50 mL/Hr) IV Continuous <Continuous>  lidocaine   4% Patch 1 Patch Transdermal every 24 hours  pantoprazole    Tablet 40 milliGRAM(s) Oral before breakfast  senna 2 Tablet(s) Oral at bedtime    MEDICATIONS  (PRN):  HYDROmorphone  Injectable 0.25 milliGRAM(s) IV Push every 6 hours PRN Severe Pain (7 - 10)  ibuprofen  Tablet. 400 milliGRAM(s) Oral every 6 hours PRN Moderate Pain (4 - 6)  metoprolol tartrate Injectable 5 milliGRAM(s) IV Push every 6 hours PRN HYPERtension , SBP > 150    RADIOLOGY & ADDITIONAL TESTS:  CXR (6/14/2023):  Support Devices: Left apical chest tube  Heart: Cardiomegaly, pacer wires.  Mediastinum: Unremarkable  Lungs/Airways: No consolidation or pleural effusion, no pneumothorax.  Bones/Soft tissues: Unremarkable

## 2023-06-14 NOTE — BRIEF OPERATIVE NOTE - COMMENTS
I first assisted for the entirety of the case, including but not limited to opening, port placement/docking, robotic instrumentation, chest tube placement, and closure. Dr. Monteiro first assisted for the entirety of the case, including but not limited to opening, port placement/docking, robotic instrumentation, chest tube placement, and closure.

## 2023-06-14 NOTE — BRIEF OPERATIVE NOTE - IV INFUSIONS - CRYSTALLOIDS
PROCEDURE: 1.  Colonoscopy to the terminal ileum with 1 cold snare and 3 cold biopsy polypectomies.  2.  Biopsy of the perineal skin lesion.    DATE OF PROCEDURE:  January 17, 2020    REFERRING PROVIDER:  Matti Rivera DO     INSTRUMENT USED:  Olympus PCF H 190 videocolonoscope.      INDICATIONS OF THE PROCEDURE: This is a 48-year-old white male for colon cancer screening.    PREVIOUS COLONOSCOPY: 2014.  History of colon polyps.  History of carcinoid.    BIOPSIES: Cecum: 1 cold biopsy polypectomy.  Ascending colon: 1 cold biopsy polypectomy.  Transverse colon: 1 cold snare polypectomy.  Descending colon: 1 cold biopsy polypectomy.  Rectum: Multiple biopsies were obtained from the area of previous resection marked by Mely ink. Perineal skin-verrucoid polypoid area.  This area was biopsied for pathology including HPV effect.      PHOTOGRAPHS:  Photographs were included in the medical records.     MEDICATIONS:  MAC.       CONSENT/PREPROCEDURE EVALUATION:  Risks, benefits, alternatives and options of the procedure including risks of sedation/anesthesia were discussed with the patient and informed consent was obtained prior to the procedure.  History and physical examination were performed and nothing precluded the test.      REPORT:  The patient was placed in left lateral decubitus position and a digital examination was performed.  Once under the influence of IV sedation, the instrument was inserted into the rectum and advanced under direct vision to cecum which was identified by the ileocecal valve, triradiate folds and appendiceal orifice. The scope was then maneuvered into the terminal ileum.        FINDINGS:      Digital rectal examination:  Good anal tone.  No perianal pathology.  No mass.  In the perineal area midway between the anus and the scrotum a polypoid verrucoid skin lesion was seen.  This was biopsied.     Terminal ileum:  7-8 cm.  Normal.     Cecum and ascending colon: Cecum: 1 cold biopsy  polypectomy.  Diverticulosis.     Hepatic flexure, transverse colon, splenic flexure: Diverticulosis.   A 5 mm sessile polyp in the transverse colon was removed with cold snare.  Descending colon: 1 cold biopsy polypectomy.      Descending colon, sigmoid colon and rectum: Diverticulosis.  A retroflex examination within the rectum revealed internal hemorrhoids.        The scope was then straightened, the lower GI tract was decompressed, and the scope was pulled out of the patient.  The patient tolerated the procedure well.  There were no immediate complications and the patient was transferred in stable condition for post procedure observation.      TECHNICAL DATA:   1. [place] prep score: 8 (3+2+3).    2. Anus to cecal time: 5  minutes.  3. Difficulty of examination:  Average.    4. Withdrawal time: 8 minutes.  5. Procedure time: 32 minutes  6. Retroflex examination in right colon: Yes.    7. Second look Rectum to cecum with decompression: Yes.    DIAGNOSES:    1. Pandiverticulosis.  2. Colon polyps.  4 were removed.  3-5 mm in size.  3. Internal hemorrhoids.  4. Verrucoid skin lesion in the perineum.  Biopsied.    RECOMMENDATIONS:     1. Dietary instructions.  2. Follow biopsies.    3. Follow-up: Call in 1 week regarding biopsy results.  Otherwise follow-up in 3 to 4 weeks.     4. Followup colonoscopy:  5 years.          Thank you very much for letting me participate in the care of this patient. Please do not hesitate to call me if you have any questions.       1000

## 2023-06-14 NOTE — PRE-ANESTHESIA EVALUATION ADULT - NSANTHOSAYNRD_GEN_A_CORE
No. JAM screening performed.  STOP BANG Legend: 0-2 = LOW Risk; 3-4 = INTERMEDIATE Risk; 5-8 = HIGH Risk

## 2023-06-14 NOTE — BRIEF OPERATIVE NOTE - NSICDXBRIEFPROCEDURE_GEN_ALL_CORE_FT
PROCEDURES:  Robot-assisted biopsy of mediastinal lymph node 14-Jun-2023 14:17:40 L VATS RA mediastinal bx Marci Mcfarland

## 2023-06-15 ENCOUNTER — TRANSCRIPTION ENCOUNTER (OUTPATIENT)
Age: 88
End: 2023-06-15

## 2023-06-15 VITALS — TEMPERATURE: 98 F

## 2023-06-15 LAB
A1C WITH ESTIMATED AVERAGE GLUCOSE RESULT: 5.9 % — HIGH (ref 4–5.6)
ANION GAP SERPL CALC-SCNC: 9 MMOL/L — SIGNIFICANT CHANGE UP (ref 5–17)
BUN SERPL-MCNC: 23 MG/DL — SIGNIFICANT CHANGE UP (ref 7–23)
CALCIUM SERPL-MCNC: 9.7 MG/DL — SIGNIFICANT CHANGE UP (ref 8.4–10.5)
CHLORIDE SERPL-SCNC: 102 MMOL/L — SIGNIFICANT CHANGE UP (ref 96–108)
CO2 SERPL-SCNC: 28 MMOL/L — SIGNIFICANT CHANGE UP (ref 22–31)
CREAT SERPL-MCNC: 0.7 MG/DL — SIGNIFICANT CHANGE UP (ref 0.5–1.3)
EGFR: 84 ML/MIN/1.73M2 — SIGNIFICANT CHANGE UP
ESTIMATED AVERAGE GLUCOSE: 123 MG/DL — HIGH (ref 68–114)
GLUCOSE SERPL-MCNC: 180 MG/DL — HIGH (ref 70–99)
HCT VFR BLD CALC: 37.3 % — SIGNIFICANT CHANGE UP (ref 34.5–45)
HGB BLD-MCNC: 12.1 G/DL — SIGNIFICANT CHANGE UP (ref 11.5–15.5)
MAGNESIUM SERPL-MCNC: 1.8 MG/DL — SIGNIFICANT CHANGE UP (ref 1.6–2.6)
MCHC RBC-ENTMCNC: 31.5 PG — SIGNIFICANT CHANGE UP (ref 27–34)
MCHC RBC-ENTMCNC: 32.4 GM/DL — SIGNIFICANT CHANGE UP (ref 32–36)
MCV RBC AUTO: 97.1 FL — SIGNIFICANT CHANGE UP (ref 80–100)
NRBC # BLD: 0 /100 WBCS — SIGNIFICANT CHANGE UP (ref 0–0)
PLATELET # BLD AUTO: 240 K/UL — SIGNIFICANT CHANGE UP (ref 150–400)
POTASSIUM SERPL-MCNC: 4.5 MMOL/L — SIGNIFICANT CHANGE UP (ref 3.5–5.3)
POTASSIUM SERPL-SCNC: 4.5 MMOL/L — SIGNIFICANT CHANGE UP (ref 3.5–5.3)
RBC # BLD: 3.84 M/UL — SIGNIFICANT CHANGE UP (ref 3.8–5.2)
RBC # FLD: 12.9 % — SIGNIFICANT CHANGE UP (ref 10.3–14.5)
SODIUM SERPL-SCNC: 139 MMOL/L — SIGNIFICANT CHANGE UP (ref 135–145)
TSH SERPL-MCNC: 0.67 UIU/ML — SIGNIFICANT CHANGE UP (ref 0.27–4.2)
WBC # BLD: 11.71 K/UL — HIGH (ref 3.8–10.5)
WBC # FLD AUTO: 11.71 K/UL — HIGH (ref 3.8–10.5)

## 2023-06-15 PROCEDURE — 71045 X-RAY EXAM CHEST 1 VIEW: CPT | Mod: 26

## 2023-06-15 PROCEDURE — 99238 HOSP IP/OBS DSCHRG MGMT 30/<: CPT

## 2023-06-15 RX ORDER — DICLOFENAC SODIUM 30 MG/G
2 GEL TOPICAL
Refills: 0 | DISCHARGE

## 2023-06-15 RX ORDER — MAGNESIUM OXIDE 400 MG ORAL TABLET 241.3 MG
800 TABLET ORAL ONCE
Refills: 0 | Status: COMPLETED | OUTPATIENT
Start: 2023-06-15 | End: 2023-06-15

## 2023-06-15 RX ORDER — ASPIRIN/CALCIUM CARB/MAGNESIUM 324 MG
1 TABLET ORAL
Qty: 0 | Refills: 0 | DISCHARGE

## 2023-06-15 RX ORDER — PANTOPRAZOLE SODIUM 20 MG/1
1 TABLET, DELAYED RELEASE ORAL
Qty: 30 | Refills: 0
Start: 2023-06-15 | End: 2023-07-14

## 2023-06-15 RX ORDER — ACETAMINOPHEN 500 MG
2 TABLET ORAL
Qty: 0 | Refills: 0 | DISCHARGE
Start: 2023-06-15

## 2023-06-15 RX ORDER — RIVAROXABAN 15 MG-20MG
1 KIT ORAL
Qty: 0 | Refills: 0 | DISCHARGE

## 2023-06-15 RX ADMIN — MAGNESIUM OXIDE 400 MG ORAL TABLET 800 MILLIGRAM(S): 241.3 TABLET ORAL at 09:46

## 2023-06-15 RX ADMIN — LIDOCAINE 1 PATCH: 4 CREAM TOPICAL at 02:34

## 2023-06-15 RX ADMIN — TRAMADOL HYDROCHLORIDE 25 MILLIGRAM(S): 50 TABLET ORAL at 01:09

## 2023-06-15 RX ADMIN — Medication 650 MILLIGRAM(S): at 11:00

## 2023-06-15 RX ADMIN — Medication 650 MILLIGRAM(S): at 09:51

## 2023-06-15 RX ADMIN — PANTOPRAZOLE SODIUM 40 MILLIGRAM(S): 20 TABLET, DELAYED RELEASE ORAL at 05:05

## 2023-06-15 RX ADMIN — Medication 100 MILLIGRAM(S): at 05:06

## 2023-06-15 NOTE — DISCHARGE NOTE PROVIDER - NSDCCPTREATMENT_GEN_ALL_CORE_FT
PRINCIPAL PROCEDURE  Procedure: Robot-assisted biopsy of mediastinal lymph node  Findings and Treatment: L VATS RA mediastinal bx  You have undergone a procedure on your chest, please attend your follow up appointments and take any prescribed medications.

## 2023-06-15 NOTE — DISCHARGE NOTE NURSING/CASE MANAGEMENT/SOCIAL WORK - PATIENT PORTAL LINK FT
You can access the FollowMyHealth Patient Portal offered by [facility] by registering at the following website: [email or web address]. By joining [employer]’s FollowMyHealth portal, you will also be able to view your health information using other applications (apps) compatible with our system.

## 2023-06-15 NOTE — DISCHARGE NOTE PROVIDER - NSDCFUADDINST_GEN_ALL_CORE_FT
-You have a suture in place where your prior drain was. Do not cut or remove the suture it will be removed at your follow up appointment. You can remove the dressing in 2 days.     -Please resume your home Xarelto and Antiplatelet medication on Friday 6/16.     -Walk daily as tolerated and use your incentive spirometer 10 times every hour while you are awake.     -Please weigh yourself daily. If you notice over a 3 pound weight gain in 3 days, this is a sign you are likely retaining too much fluid. It is imperative you call our right away with unexplained rapid weight gain.      -Please continue to wear the compression stockings given to you in the hospital at home. This is a way to prevent fluid from building up in your legs.     -No driving or strenuous activity/exercise until cleared by your surgeon.    -Gently clean your incisions with unscented/antibacterial soap and water, pat dry.  You may leave them open to air.    -Call your doctor if you have shortness of breath, chest pain not relieved by pain medication, dizziness, fever >100.5, or increased redness or drainage from incisions.

## 2023-06-15 NOTE — DISCHARGE NOTE PROVIDER - NSDCCPCAREPLAN_GEN_ALL_CORE_FT
PRINCIPAL DISCHARGE DIAGNOSIS  Diagnosis: Hilar lymphadenopathy  Assessment and Plan of Treatment:

## 2023-06-15 NOTE — DISCHARGE NOTE PROVIDER - CARE PROVIDER_API CALL
Leandro Ramirez  [facility]  Phone: [phone]  Fax: (   )    -  Scheduled Appointment: 07/10/2023 10:00 AM    Abel Grande  Thoracic Surgery  [address] [place] [address]  Phone: [phone]  Fax: [phone]  Follow Up Time: 2 weeks

## 2023-06-15 NOTE — DISCHARGE NOTE PROVIDER - NSDCFUADDAPPT_GEN_ALL_CORE_FT
Regarding your appointment with Dr. Grande, their office requires you to call and schedule an appointment.

## 2023-06-15 NOTE — DISCHARGE NOTE PROVIDER - NSDCMRMEDTOKEN_GEN_ALL_CORE_FT
acetaminophen 325 mg oral tablet: 2 tab(s) orally every 6 hours As needed Mild Pain (1 - 3)  atorvastatin 20 mg oral tablet: 1 orally once a day  Boniva 150 mg oral tablet: 1 orally once a day  cholecalciferol 25 mcg (1000 intl units) oral capsule: 1 orally once a day  Diovan HCT 80 mg-12.5 mg oral tablet: 1 orally once a day  Lasix 20 mg oral tablet: 1 orally once a day  pantoprazole 40 mg oral delayed release tablet: 1 tab(s) orally once a day (before a meal)  Vazalore 81 mg oral capsule: 1 capsule orally once a day Start on 6/16 (friday)  Vitamin B6: 50 daily  Xarelto 10 mg oral tablet: 1 tablet orally once a day Start on 6/16 (friday)

## 2023-06-15 NOTE — DISCHARGE NOTE PROVIDER - NSDCFUSCHEDAPPT_GEN_ALL_CORE_FT
Leandro Ramirez  [facility]  [facility] [address]  Scheduled Appointment: 07/10/2023

## 2023-06-15 NOTE — DISCHARGE NOTE PROVIDER - HOSPITAL COURSE
Patient discussed on morning rounds with Dr. Ramirez     Operation Date: 6/14/23: L VATS RA mediastinal bx     Primary Surgeon/Attending MD: Dr. Ramirez     Referring Physician: Dr. Abel Grande   _ _ _ _ _ _ _ _ _ _ _ _  HOSPITAL COURSE:  86 yo F with past medical history of tobacco use and left upper lobe mass in 2022. Of note, patient underwent left upper lobectomy and mediastinal lymph node dissection 7/2022. Pathology consistent with Hodgkin's disease (no chemo). Recent PET scan with new mediastinal lymphadenopathy. Given history and study results it was recommended she undergo biopsy. Presented to our facility today and underwent uncomplicated L VATS RA mediastinal biopsy. 1 left pleural CT in place attached to suction. CXR with no pneumo. Seen in PACU. Pain well controlled on current regimen. Hemodynamically stable. CT with 1/5 air leak. Drainage currently 183cc (was 120cc on arrival to PACU). On POD 1 CT was placed to waterseal overnight and a small apical ptx noted, per Dr. Ramirez no waterseal cxr needed and to remove the CT. CT removed without incident and repeat cxr showed stability of ptx which was reviewed by Dr. Ramirez. Per Dr. Ramirez pt was deemed ready for discharge. Pt ambulated without assistance, tolerated a PO diet, and had a post operative BM. Pt denies dizziness, vision changes, chest pain, palpitations, shortness of breath, cough, n/v/d, extremity swelling, calf tenderness.     *Per Dr. Ramirez pt to resume Xaralto (home med) on Friday (6/16), pt has adequate supply and did not need refills.   _ _ _ _ _ _ _ _ _ _ _ _  DISCHARGE PHYSICAL EXAM:  GEN: NAD, looks comfortable  Psych: Mood appropriate  Neuro: A&Ox3.  No focal deficits.  Moving all extremities.   HEENT: No obvious abnormalities  CV: S1S2, regular, no murmurs appreciated.  No carotid bruits.  No JVD  Lungs: Clear B/L.  No wheezing, rales or rhonchi  ABD: Soft, non-tender, non-distended.   EXT: Warm and well perfused.  No peripheral edema noted  Musculoskeletal: Moving all extremities with normal ROM, no joint swelling  PV: Pedal pulses palpable  Incisions: VATS sites are clean dry and intact with steristrips no subq air noted, CT site clean dry and intact with a tie down in place and covered with occlusive dressing.   _ _ _ _ _ _ _ _ _ _ _ _  REMOVAL CHECKLIST:          [ ] Stitches/tie downs,   If no, why? CT removed day of discharge   _ _ _ _ _ _ _ _ _ _ _ _   MEDICATION DISCHARGE CHECKLIST      Anticoagulation        [ X] NOAC, [ ] Reason Home med to resume Friday on 6/15              Cost/Insurance barriers addressed: YES  _ _ _ _ _ _ _ _ _ _ _ _  RELEVANT LABS/IMAGING:  < from: Xray Chest 1 View- PORTABLE-Routine (Xray Chest 1 View- PORTABLE-Routine in AM.) (06.15.23 @ 06:10) >    IMPRESSION:    Small linear density left lungapex suspicious for tiny left apex   pneumothorax. No lung consolidation or significant pleural effusion. Left   chest tube and left-sided implanted cardiac device unchanged compared to   prior exam 6/14/2023.    --- End of Report ---    < end of copied text >      _ _ _ _ _ _ _ _ _ _ _ _    Over 35 minutes was spent with the patient reviewing the discharge material including medications, follow up appointments, recovery, concerning symptoms, and how to contact their health care providers if they have questions Patient discussed on morning rounds with Dr. Ramirez     Operation Date: 6/14/23: L VATS RA mediastinal bx     Primary Surgeon/Attending MD: Dr. Ramirez     Referring Physician: Dr. Abel Grande   _ _ _ _ _ _ _ _ _ _ _ _  HOSPITAL COURSE:  86 yo F with past medical history of tobacco use and left upper lobe mass in 2022. Of note, patient underwent left upper lobectomy and mediastinal lymph node dissection 7/2022. Pathology consistent with Hodgkin's disease (no chemo). Recent PET scan with new mediastinal lymphadenopathy. Given history and study results it was recommended she undergo biopsy. Presented to our facility today and underwent uncomplicated L VATS RA mediastinal biopsy. 1 left pleural CT in place attached to suction. CXR with no pneumo. Seen in PACU. Pain well controlled on current regimen. Hemodynamically stable. CT with 1/5 air leak. Drainage currently 183cc (was 120cc on arrival to PACU). On POD 1 CT was placed to waterseal overnight and a small apical ptx noted, per Dr. Ramirez no waterseal cxr needed and to remove the CT. CT removed without incident and repeat cxr showed stability of ptx which was reviewed by Dr. Ramirez. Per Dr. Ramirez pt was deemed ready for discharge. Pt ambulated without assistance, tolerated a PO diet, and had a post operative BM. Pt denies dizziness, vision changes, chest pain, palpitations, shortness of breath, cough, n/v/d, extremity swelling, calf tenderness.     *Per Dr. Ramirez pt to resume Xaralto (home med) on Friday (6/16), pt has adequate supply and did not need refills.   _ _ _ _ _ _ _ _ _ _ _ _  DISCHARGE PHYSICAL EXAM:  GEN: NAD, looks comfortable  Psych: Mood appropriate  Neuro: A&Ox3.  No focal deficits.  Moving all extremities.   HEENT: No obvious abnormalities  CV: S1S2, regular, no murmurs appreciated.  No carotid bruits.  No JVD  Lungs: Clear B/L.  No wheezing, rales or rhonchi  ABD: Soft, non-tender, non-distended.   EXT: Warm and well perfused.  No peripheral edema noted  Musculoskeletal: Moving all extremities with normal ROM, no joint swelling  PV: Pedal pulses palpable  Incisions: VATS sites are clean dry and intact with steristrips no subq air noted, CT site clean dry and intact with a tie down in place and covered with occlusive dressing.   _ _ _ _ _ _ _ _ _ _ _ _  REMOVAL CHECKLIST:          [ ] Stitches/tie downs,   If no, why? CT removed day of discharge   _ _ _ _ _ _ _ _ _ _ _ _   MEDICATION DISCHARGE CHECKLIST      Anticoagulation        [ X] NOAC, [ ] Reason Home med to resume Friday on 6/15              Cost/Insurance barriers addressed: YES  _ _ _ _ _ _ _ _ _ _ _ _  RELEVANT LABS/IMAGING:  < from: Xray Chest 1 View-PORTABLE IMMEDIATE (Xray Chest 1 View-PORTABLE IMMEDIATE .) (06.15.23 @ 10:26) >      IMPRESSION:    Left chest tube removed since prior exam earlier same day. Small left   apex pneumothorax. No focal infiltrate or significant pleural effusion.   Left-sided implanted cardiac device.    --- End of Report ---      < end of copied text >    _ _ _ _ _ _ _ _ _ _ _ _    Over 35 minutes was spent with the patient reviewing the discharge material including medications, follow up appointments, recovery, concerning symptoms, and how to contact their health care providers if they have questions

## 2023-06-15 NOTE — DISCHARGE NOTE NURSING/CASE MANAGEMENT/SOCIAL WORK - NSDCPEFALRISK_GEN_ALL_CORE
For information on Fall & Injury Prevention, visit: [email or web address] OR  [email or web address] OR  https://www.cdc.gov/steadi/patient.html

## 2023-06-15 NOTE — CHART NOTE - NSCHARTNOTEFT_GEN_A_CORE
CT Removal:    Pt seen and examined at bedside.  Case discussed with Dr. Ramirez  and is recommending removal of CT.  Minimal output from CT.  No air leak appreciated.  CT removed without incident.  Occlusive dressing placed. Follow up CXR with stable prior noted small apical PTX, reviewed by Dr. Ramirez and deemed stable.  Pt tolerated procedure well and remained hemodynamically stable.
Patient seen and examined. Plan to dc Malick.

## 2023-06-15 NOTE — DISCHARGE NOTE PROVIDER - PROVIDER TOKENS
FREE:[LAST:[James],FIRST:[Leandro],PHONE:[phone]],FAX:[(   )    -],ADDRESS:[[facility]],SCHEDULEDAPPT:[07/10/2023],SCHEDULEDAPPTTIME:[10:00 AM]],PROVIDER:[TOKEN:[[id number]],FOLLOWUP:[2 weeks]]

## 2023-06-21 LAB — SURGICAL PATHOLOGY STUDY: SIGNIFICANT CHANGE UP

## 2023-06-26 DIAGNOSIS — I10 ESSENTIAL (PRIMARY) HYPERTENSION: ICD-10-CM

## 2023-06-26 DIAGNOSIS — E78.00 PURE HYPERCHOLESTEROLEMIA, UNSPECIFIED: ICD-10-CM

## 2023-06-26 DIAGNOSIS — I25.10 ATHEROSCLEROTIC HEART DISEASE OF NATIVE CORONARY ARTERY WITHOUT ANGINA PECTORIS: ICD-10-CM

## 2023-06-26 DIAGNOSIS — R59.0 LOCALIZED ENLARGED LYMPH NODES: ICD-10-CM

## 2023-06-26 DIAGNOSIS — Z86.711 PERSONAL HISTORY OF PULMONARY EMBOLISM: ICD-10-CM

## 2023-06-26 DIAGNOSIS — Z79.01 LONG TERM (CURRENT) USE OF ANTICOAGULANTS: ICD-10-CM

## 2023-06-26 DIAGNOSIS — Z90.49 ACQUIRED ABSENCE OF OTHER SPECIFIED PARTS OF DIGESTIVE TRACT: ICD-10-CM

## 2023-06-26 DIAGNOSIS — C81.22 MIXED CELLULARITY HODGKIN LYMPHOMA, INTRATHORACIC LYMPH NODES: ICD-10-CM

## 2023-06-26 DIAGNOSIS — Z87.891 PERSONAL HISTORY OF NICOTINE DEPENDENCE: ICD-10-CM

## 2023-06-26 DIAGNOSIS — Z88.0 ALLERGY STATUS TO PENICILLIN: ICD-10-CM

## 2023-06-26 DIAGNOSIS — Z90.2 ACQUIRED ABSENCE OF LUNG [PART OF]: ICD-10-CM

## 2023-07-10 ENCOUNTER — APPOINTMENT (OUTPATIENT)
Dept: THORACIC SURGERY | Facility: CLINIC | Age: 88
End: 2023-07-10
Payer: MEDICARE

## 2023-07-10 ENCOUNTER — RESULT REVIEW (OUTPATIENT)
Age: 88
End: 2023-07-10

## 2023-07-10 PROCEDURE — 99213 OFFICE O/P EST LOW 20 MIN: CPT

## 2023-07-11 NOTE — REASON FOR VISIT
[de-identified] : L VATS RA mediastinal biopsy [de-identified] : 6/14/2023 [de-identified] : 86 yo F with past medical history of tobacco use and left upper lobe mass in 2022. Of note, patient underwent left upper lobectomy and mediastinal lymph node dissection 7/2022. Pathology consistent with Hodgkin's disease (no chemo). Recent PET scan with new mediastinal lymphadenopathy. Given history and study results it was recommended she undergo biopsy. Presented to our facility today and underwent uncomplicated L VATS RA mediastinal biopsy. 1 left pleural CT in place attached to suction. CXR with no pneumo. Seen in PACU. Pain well \par controlled on current regimen. Hemodynamically stable. CT with 1/5 air leak. Drainage currently 183cc (was 120cc on arrival to PACU). On POD 1 CT was placed to waterseal overnight and a small apical ptx noted, per Dr. Ramirez no waterseal cxr needed and to remove the CT. CT removed without incident and repeat cxr showed stability of ptx which was reviewed by Dr. Ramirez. Per Dr. Ramirez pt was deemed ready for discharge. Pt ambulated without assistance, tolerated a PO diet, and had a post operative BM. Pt denies dizziness, vision changes, chest \par pain, palpitations, shortness of breath, cough, n/v/d, extremity swelling, calf tenderness. Pt. now presents for post operative appointment. 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

## 2023-07-11 NOTE — DISCUSSION/SUMMARY
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 Suture x1 removed incident. Surgical site clean, dry and intact with no signs or symptoms of infection upon assessment. Patient reports good pain control.

## 2023-07-11 NOTE — ASSESSMENT
[FreeTextEntry1] : EVELIN GILBERT is status post L VATS RA mediastinal biopsy and she is here for a post-op visit. \par Surgery Date: 6/14/2023 88 yo F with past medical history of tobacco use and left upper lobe mass in 2022. Of note, patient underwent left upper lobectomy and mediastinal lymph node dissection 7/2022. Pathology consistent with Hodgkin's disease (no chemo). Recent PET scan with new mediastinal lymphadenopathy. Given history and study results it was recommended she undergo biopsy. Presented to our facility today and underwent uncomplicated L VATS RA mediastinal biopsy. 1 left pleural CT in place attached to suction. CXR with no pneumo. Seen in PACU. Pain well \par controlled on current regimen. Hemodynamically stable. CT with 1/5 air leak. Drainage currently 183cc (was 120cc on arrival to PACU). On POD 1 CT was placed to waterseal overnight and a small apical ptx noted, per Dr. Ramirez no waterseal cxr needed and to remove the CT. CT removed without incident and repeat cxr showed stability of ptx which was reviewed by Dr. Ramirez. Per Dr. Ramirez pt was deemed ready for discharge. Pt ambulated without assistance, tolerated a PO diet, and had a post operative BM. Pt denies dizziness, vision changes, chest \par pain, palpitations, shortness of breath, cough, n/v/d, extremity swelling, calf tenderness. Pt. now presents for post operative appointment. 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 \par \par She is doing well. She has no complaints. Her CXR is stable. She will followup with her oncologist and with us PRN.\par \par Plan:\par 1. Followup oncology\par \par ILeandro MD personally performed the services described in the documentation, reviewed the documentation recorded by the scribe in my presence and it accurately and completely records my words and actions. I have personally seen, examined, and participated in the care of this patient.  I have reviewed all pertinent clinical information, including history and physical exam and plan.  I agree with the above history, physical and plan of the ACP which I have reviewed and edited where appropriate.\par \par Total time of 15 minutes with > 50% spent with the patient discussing pathology and postoperative course.\par

## 2023-07-24 PROBLEM — Z87.19 PERSONAL HISTORY OF OTHER DISEASES OF THE DIGESTIVE SYSTEM: Chronic | Status: ACTIVE | Noted: 2023-06-13

## 2023-07-24 PROBLEM — Z86.79 PERSONAL HISTORY OF OTHER DISEASES OF THE CIRCULATORY SYSTEM: Chronic | Status: ACTIVE | Noted: 2023-06-13

## 2023-07-24 PROBLEM — Z86.39 PERSONAL HISTORY OF OTHER ENDOCRINE, NUTRITIONAL AND METABOLIC DISEASE: Chronic | Status: ACTIVE | Noted: 2023-06-13

## 2023-07-24 PROBLEM — R59.0 LOCALIZED ENLARGED LYMPH NODES: Chronic | Status: ACTIVE | Noted: 2023-06-13

## 2023-07-24 PROBLEM — Z91.89 OTHER SPECIFIED PERSONAL RISK FACTORS, NOT ELSEWHERE CLASSIFIED: Chronic | Status: ACTIVE | Noted: 2023-06-13

## 2023-07-24 PROBLEM — Z85.71 PERSONAL HISTORY OF HODGKIN LYMPHOMA: Chronic | Status: ACTIVE | Noted: 2023-06-13

## 2023-07-28 ENCOUNTER — TRANSCRIPTION ENCOUNTER (OUTPATIENT)
Age: 88
End: 2023-07-28

## 2023-12-07 PROCEDURE — 86900 BLOOD TYPING SEROLOGIC ABO: CPT

## 2023-12-07 PROCEDURE — 36415 COLL VENOUS BLD VENIPUNCTURE: CPT

## 2023-12-07 PROCEDURE — 83036 HEMOGLOBIN GLYCOSYLATED A1C: CPT

## 2023-12-07 PROCEDURE — C9399: CPT

## 2023-12-07 PROCEDURE — 86850 RBC ANTIBODY SCREEN: CPT

## 2023-12-07 PROCEDURE — 71045 X-RAY EXAM CHEST 1 VIEW: CPT

## 2023-12-07 PROCEDURE — 85027 COMPLETE CBC AUTOMATED: CPT

## 2023-12-07 PROCEDURE — 83735 ASSAY OF MAGNESIUM: CPT

## 2023-12-07 PROCEDURE — 88341 IMHCHEM/IMCYTCHM EA ADD ANTB: CPT

## 2023-12-07 PROCEDURE — 88331 PATH CONSLTJ SURG 1 BLK 1SPC: CPT

## 2023-12-07 PROCEDURE — S2900: CPT

## 2023-12-07 PROCEDURE — 85730 THROMBOPLASTIN TIME PARTIAL: CPT

## 2023-12-07 PROCEDURE — 86870 RBC ANTIBODY IDENTIFICATION: CPT

## 2023-12-07 PROCEDURE — 86901 BLOOD TYPING SEROLOGIC RH(D): CPT

## 2023-12-07 PROCEDURE — 88307 TISSUE EXAM BY PATHOLOGIST: CPT

## 2023-12-07 PROCEDURE — 86880 COOMBS TEST DIRECT: CPT

## 2023-12-07 PROCEDURE — C1889: CPT

## 2023-12-07 PROCEDURE — 85025 COMPLETE CBC W/AUTO DIFF WBC: CPT

## 2023-12-07 PROCEDURE — 86905 BLOOD TYPING RBC ANTIGENS: CPT

## 2023-12-07 PROCEDURE — 85610 PROTHROMBIN TIME: CPT

## 2023-12-07 PROCEDURE — 80048 BASIC METABOLIC PNL TOTAL CA: CPT

## 2023-12-07 PROCEDURE — 84443 ASSAY THYROID STIM HORMONE: CPT

## 2024-05-24 NOTE — HISTORY OF PRESENT ILLNESS
Care Suites Discharge Nursing Note    Patient Information  Name: Luis Miguel Barnett  Age: 67 year old    Discharge Education:  Discharge instructions reviewed: Yes- tamica Moore  Additional education/resources provided: n/a  Patient/patient representative verbalizes understanding: Yes  Patient discharging on new medications: No  Medication education completed: N/A    Discharge Plans:   Discharge location: home  Discharge ride contacted: Yes  Approximate discharge time: 1715    Discharge Criteria:  Discharge criteria met and vital signs stable: Yes    Patient Belongs:  Patient belongings returned to patient: Yes    Ariadna Navarro RN       [de-identified] : 86 yr old s/p exlap for high grade sbo on 6/15/22.  During that hospitalization she was also noted to have a 3.7 cm left lung mass that underwent bx.  She was seen by Dr. Grande in consultation. She is currently doing well.  She is eating but not back to her pre-op level.  She has some mild SOB but states it is much better as she walked to my office today from the parking area.  She feels somewhat bloated, but is having normal bowel movements and passing gas - though sporadically.  She denies any nausea.  \par She does not know her lung bx results.

## 2024-08-04 PROBLEM — Z85.71 HISTORY OF HODGKIN'S LYMPHOMA: Status: RESOLVED | Noted: 2023-05-16 | Resolved: 2024-08-04

## 2024-08-24 ENCOUNTER — RESULT REVIEW (OUTPATIENT)
Age: 89
End: 2024-08-24

## 2024-08-26 ENCOUNTER — TRANSCRIPTION ENCOUNTER (OUTPATIENT)
Age: 89
End: 2024-08-26

## 2024-09-19 ENCOUNTER — TRANSCRIPTION ENCOUNTER (OUTPATIENT)
Age: 89
End: 2024-09-19

## 2024-10-11 ENCOUNTER — RESULT REVIEW (OUTPATIENT)
Age: 89
End: 2024-10-11

## 2024-10-28 ENCOUNTER — TRANSCRIPTION ENCOUNTER (OUTPATIENT)
Age: 89
End: 2024-10-28

## 2024-12-23 NOTE — PRE-ANESTHESIA EVALUATION ADULT - NSANTHDISPORD_GEN_ALL_CORE
Frankie Barber MD  Performed by: Frankie Barber MD  Authorized by: Frankie Barber PACU

## (undated) DEVICE — ELCTR GROUNDING PAD ADULT COVIDIEN

## (undated) DEVICE — XI OBTURATOR OPTICAL BLADELESS 8MM

## (undated) DEVICE — SUT VICRYL 4-0 18" RB-1 UNDYED (POP-OFF)

## (undated) DEVICE — DRSG GAUZE PACKTNER ROLL

## (undated) DEVICE — XI 12MM AND STAPLER CANNULA SEAL

## (undated) DEVICE — SUT VICRYL 2-0 27" SH

## (undated) DEVICE — DRSG TELFA 3 X 8

## (undated) DEVICE — SUT SILK 0 30" SH

## (undated) DEVICE — SUT SILK 2-0 30" SH

## (undated) DEVICE — XI DRAPE COLUMN

## (undated) DEVICE — XI DRAPE ARM

## (undated) DEVICE — ENDOCATCH 10MM SPECIMEN POUCH

## (undated) DEVICE — ENDOCATCH II 15MM

## (undated) DEVICE — SPONGE ENDO PEANUT 5MM

## (undated) DEVICE — D HELP - CLEARVIEW CLEARIFY SYSTEM

## (undated) DEVICE — GOWN XL W TOWEL

## (undated) DEVICE — XI STAPLER SUREFORM 60

## (undated) DEVICE — STAPLER COVIDIEN ENDO GIA STANDARD HANDLE

## (undated) DEVICE — TROCAR ETHICON ENDOPATH XCEL BLADELESS 15MM X 100MM STABILITY

## (undated) DEVICE — XI ENDOWRIST STAPLER SHEATH

## (undated) DEVICE — Device

## (undated) DEVICE — SUT VICRYL 4-0 27" RB-1 UNDYED

## (undated) DEVICE — XI VESSEL SEALER

## (undated) DEVICE — TUBING STRYKER PNEUMOCLEAR SMOKE HEAT HUMID

## (undated) DEVICE — XI SEAL UNIV 5- 8 MM

## (undated) DEVICE — TUBING STRYKER PNEUMOCLEAR HIGH FLOW HEATED

## (undated) DEVICE — WARMING BLANKET UPPER ADULT

## (undated) DEVICE — XI TIP COVER

## (undated) DEVICE — CONNECTOR STRAIGHT 1/4 X 3/8"

## (undated) DEVICE — FRAZIER CONNECTING TUBE 2FT 5MM

## (undated) DEVICE — GLV 7.5 PROTEXIS (WHITE)

## (undated) DEVICE — VENODYNE/SCD SLEEVE CALF MEDIUM

## (undated) DEVICE — CHEST DRAIN PLEUR-EVAC DRY/WET ADULT-PEDS SINGLE (QUICK)

## (undated) DEVICE — PACK ROBOTIC

## (undated) DEVICE — ENDOCATCH GENERAL 10MM (PURPLE)

## (undated) DEVICE — SUT VICRYL 4-0 2" RB-1

## (undated) DEVICE — DVC ASCOPE 4 SNGL USE SLIM

## (undated) DEVICE — XI ENDOWRIST 12 - 8 MM CANNULA REDUCER

## (undated) DEVICE — WARMING BLANKET LOWER ADULT

## (undated) DEVICE — XI ENDOWRIST SUCTION IRRIGATOR 8MM